# Patient Record
Sex: MALE | Race: WHITE
[De-identification: names, ages, dates, MRNs, and addresses within clinical notes are randomized per-mention and may not be internally consistent; named-entity substitution may affect disease eponyms.]

---

## 2019-01-08 NOTE — E[place] [address]
Phone:  [phone]                     ELECTROCARDIOGRAM REPORT      
_______________________________________________________________________________
 
Name:       RITA DEL RIO             Room:           [address]    ADM IN  
M.R.#:  [id number]      Account #:      [id number]  
Admission:  19     Attend Phys:    AYE Dang
Discharge:               Date of Birth:  [birth year]  
         Report #: [id number]
    [id number]
_______________________________________________________________________________
THIS REPORT FOR:  //name//                      
 
                         [facility] ED
                                       
Test Date:    2019               Test Time:    19:28:31
Pat Name:     RITA DEL RIO         Department:   
Patient ID:   [id number]            Room:         [facility]
Gender:                               Technician:   ISABEL MAYER
:          1950               Requested By: Jordan Ortiz
Order Number: [id number] MD:   Gray Hall
                                 Measurements
Intervals                              Axis          
Rate:         68                       P:            30
NY:           262                      QRS:          -30
QRSD:         99                       T:            134
QT:           417                                    
QTc:          444                                    
                           Interpretive Statements
Sinus rhythm
Prolonged NY interval
Inferior infarct, old
Abnrm T, consider ischemia, anterolateral lds
No previous ECG available for comparison
 
Electronically Signed On 2019 16:56:48 CST by Gray Hall
[email or web address]
 
 
 
 
 
 
 
 
 
 
 
 
 
 
 
 
 
  <ELECTRONICALLY SIGNED>
                                           By: Gray Hall MD, FACC   
  19     1656
D: 19   _____________________________________
T: 19   Gray Hall MD, [facility]     /EPI

## 2019-01-08 NOTE — NUR
PT TRANSFERRED TO [facility] FLOOR AT 1540 ON A WHEELCHAIR ACCOMPANIED BY TWO FAMILY
MEMBERS, AND NURSING STAFF. VSS. SINUS RYHTM ON THE CARDIAC MONITOR. NO TEMP.
98.8 ORALLY. NO COMPLAINT. DIET ORDERED, PT AWAITING FOR DINNER IN BED. ECHO
COMPLETED AT BEDSIDE. PT IN BED CALL LIGHT AT REACH. WILL CONTINUE TO MONITOR.

## 2019-01-08 NOTE — NUR
Pt received from Tele at 0415; transferred due to resp distress which occurred
after pt vomited and possibly aspirated.  Pt on BIPAP.  VSS except T 102.4
axillary.  Family members X3 arrived to room soon afterward, including pt's
dtr and wife (wife arrived after other 2 visitors).  Pt calm and cooperative,
reports tolerating BIPAP and mask well.  Wright placed, draining clr yellow
urine.  IV antibiotics administered.  Rash on right side of chest which
appears to be caused by skin-on-skin contact between R arm and chest.  R arm
appears contracted and immobile, and resting against chest.  Will continue to
monitor.

## 2019-01-08 NOTE — NUR
PT RECEIVED FROM ED. SAT MAINTAINED IN RA. CALL LIGHT WITHIN REACH AND BED IN
LOW POSITION. PT VOMITED APPROX AMOUNT 5ML. SAT DECREASED, NOT MAINTAINED IN
NC, CONNECTED TO NON-REBREATHER ON 15L, CALLED RRT. SAT 82% WITH NRB. PT KEPT
ON BIPAP 100% FIO2. SAT 98%, ABLE TO FOLLOW COMMANDS AND TALK. C-XRAY AND EKG
DONE AND CHARTED. DR. MILLER INFORMED. ORDERS FOLLOWED FOR CT. PT TRANSFERRED
DOWN TO ICU.

## 2019-01-08 NOTE — EKG
[place] [address]
Phone:  [phone]                     ELECTROCARDIOGRAM REPORT      
_______________________________________________________________________________
 
Name:       RITA DEL RIO             Room:           [address]    ADM IN  
M.R.#:  [id number]      Account #:      [id number]  
Admission:  19     Attend Phys:    AYE Dang
Discharge:               Date of Birth:  [birth year]  
         Report #: [id number]
    [id number]
_______________________________________________________________________________
THIS REPORT FOR:  //name//                      
 
                          [facility]
                                       
Test Date:    2019               Test Time:    02:51:50
Pat Name:     RITA DEL RIO         Department:   
Patient ID:   [id number]            Room:         [facility]
Gender:       M                        Technician:   
:          1950               Requested By: Daisha Cohen
Order Number: [id number]    Dot MD:   Gray Hall
                                 Measurements
Intervals                              Axis          
Rate:         114                      P:            
LA:                                    QRS:          -16
QRSD:         85                       T:            132
QT:           308                                    
QTc:          425                                    
                           Interpretive Statements
sinus tachycardia
Ventricular premature complex
Inferior infarct, old
Anteroseptal infarct, old
Repol abnrm suggests ischemia, diffuse leads
No previous ECG available for comparison
 
Electronically Signed On 2019 17:00:14 CST by Gray Hall
[email or web address]
 
 
 
 
 
 
 
 
 
 
 
 
 
 
 
 
  <ELECTRONICALLY SIGNED>
                                           By: Gray Hall MD, [facility]   
  19     1700
D: 19 0251   _____________________________________
T: 19 0251   Gray Hall MD, [facility]     /EPI

## 2019-01-08 NOTE — NUR
TROPONIN LEVEL 20.9. DR ALEXANDER PAGED . DR LINSTON CALLED BACK AND SAID HE
IS AWARE TROPONIN WAS GOING TO BE ELEVATED. THAT HE WILL FOLLOW UP WITH THAT.

## 2019-01-09 NOTE — NUR
MET WITH PT TO DISCUSS HOME SITUATION/DC PLANNING. PT LIVES WITH WIFE.  HE
STATES HE IS VERY DEPENDENT ON HER, HE HAS HAD 'STROKES'.  CALL TO WIFE/MILA.
 SHE IS DPOA AND WILL BRING IN COPY OF FORM.  SHE STATED THEY RECENTLY MOVED
FROM [place] TO A HANDICAP ACCESSIBLE APT NEAR THEIR DTR/[place]AN[place] WHO LIVE ACROSS
THE STREET.  PT HAS RIGHT SIDED WEAKNESS FROM STROKE  AND WIFE ASSISTS WITH
ADLS, AND DOES ALL IADLS.  THEY HAVE A STAND ASSIST, MOTORIZED BED, JOHAN CANE,
W/C AND SHOWER BENCH.  PT HAS HAD REHAB STAY IN 2017 AS WELL AS SNF IN PAST.
HE HAS DONE OUTPT THERAPY AT [facility] IN [place].  SHE IS INTERESTED IN HIM
GOING TO REHAB AGAIN IF QUALIFIES, IF NOT, WOULD PREFER TO GO HOME WITH HH AS
SHE'S WORRIED HE'D GET DEPRESSED IN SNF.  DISCUSSED HH OPTIONS WITH HER ALSO.
SHE HAS NO PREFERENCE.  DISCUSSED WITH DR POLANCO, REHAB CONSULT PENDING.
WILL FOLLOW

## 2019-01-09 NOTE — NUR
RECEIVED REPORT FROM MAGO AND ASSUMED CARE OF PT @ 0098.PT IS A/O
X4,VSS,TRACING SB WITH 1ST DEGREE ON THE MONITOR.PT REMAINS ON 2L O2
NC.ASSESSMENT AS CHARTED.IV LEFT FOREARM PATENT AND SALINE LOCKED. IV #2 LEFT
FOREARM PATENT AND SALINE LOCKED.IV ANTIBIOTICS GIVEN.GARCIA SECURE AND
PATENT.PT NPO STATUS PENDING CARDIOLOGY SEEING THEM TODAY.PT IS CALM AND
COOPERATIVE WITH NO C/O PAIN AT TIME OF ASSESSMENT.PT IS UP WITH ASSIST OF
ONE.PT LEFT RESTING IN BED WITH CALL LIGHT AND FALL PRECAUTIONS IN PLACE.WILL
COTNINUE TO MONITOR.

## 2019-01-09 NOTE — NUR
patient remains without chest pain this shift. patient denies discomfort. o2
sat maintained on 2l o2 nc although lung sounds remain coarse. patient
suspected to be at risk for aspiration due to difficulty swallowing thin
liquids and slight cough afterwards. patient remains npo for cardiology
consult and ST evaluation. patient verbalizes understanding. pt incontinent of
bowel. cesar care and barrier cream provided. call light within reach

## 2019-01-09 NOTE — NUR
VSS.CARDIAC MONITORING IN PLACE WITH NO CHANGES.PT REMAINS ON 2L O2 NC.NO C/O
PAIN.PT HAS BEEN REPOSITIONED Q2 HOUR.IV LEFT FOREARM PATENT AND SALINE
LOCKED.IV #2 PATENT AND SALINE LOCKED.PT TO BE NPO AT MIDNIGHT FOR CATH IN
AM.PT INFORMED OF PLAN OF CARE AND COMMUNICATES UNDERSTANDING.HOURLY ROUNDING
COMPELTED FOR PT SAFETY.CALL LIGHT AND FALL PRECAUTIONS IN PLACE.WILL CONTINUE
TO MONITOR FOR DURATION OF SHIFT.

## 2019-01-10 NOTE — NUR
vss, PT IS PROGRESSING TOWARD GOAL, PT HAS BEEN UP IN CHAIR, WORKED WITH
PT/OT/ST, HOURLY ROUNDS COMPLETED AND CHART CHECKED.

## 2019-01-10 NOTE — NUR
VSS, ASSUMED CARE THIS AM, ASSESSMENT PERFORMED AND CHARTED, FALL PRECAUTIONS
IN PLACE AND CALL LIGHT IN REACH, PT IS A&O4, ON 2L NC, UP QITH ONE TO
BEDSIDE, PT IS TRACIG SR ON THE MONITOR, HAS GARCIA IN PLACE AND IS DRAINIG, PT
GOAL IS TO SIT UP IN CHAIR, HE HAS RIGHT ARMWEAKNWSS FROM ILD STROKW, HW IS AT
RISK FOR ASPIRATION AND SPEECH IS WORKING WITH HIM, PT MIGHT HEART CATH TODAY
AND IS NPO. WILL FOLLOW WITH PLAN OF CARE.

## 2019-01-10 NOTE — NUR
RE: CHF medication education. Met with patient to discuss heart failure
medication. Pt's wife present during discussion. Review focused on carvedilol,
lisinopril & isosorbide. Discussed rationale for therapy and importance of
compliance with prescribed regimen. Reviewed possible side effects and
potential management strategies. Left medication information sheet with
patient. Provided pharmacy contact information for any further questions or
issues.  thank you.

## 2019-01-10 NOTE — CON
[address]  [address]                    CONSULTATION                  
_______________________________________________________________________________
 
Name:       RITA DEL RIO             Room:           [address] [place]  
.R.#:  [id number]      Account #:      [id number]  
Admission:  01/07/19     Attend Phys:    AYE Dang
Discharge:               Date of Birth:  [birth year]  
         Report #: [id number]
                                                                     [id number]  
_______________________________________________________________________________
THIS REPORT FOR:  //name//                      
 
CC:  _____ 
    Aubree Varela DO
    Daisha Cohen
 
DATE OF SERVICE:  01/08/2019
 
 
INDICATION:  Non-ST elevation myocardial infarction.
 
HISTORY OF PRESENT ILLNESS:  The patient is a very pleasant 68-year-old
gentleman who was admitted to the hospital with sore throat, cough and
right-sided pneumonia.  He denied any chest pain.  He did have some shortness of
breath occasionally.  He denies orthopnea.  In this setting, he had a troponin
of 8.  EKG did not show any evidence of ST elevation.  The patient has remained
pain free since admission to the hospital.  He does report having several stents
placed in 2015 at [facility].  He also reports having CVA on 2 separate
occasions.  He presently denies any orthopnea or paroxysmal nocturnal dyspnea. 
He is without other cardiac complaint at this time.
 
PAST MEDICAL HISTORY:
1.  Coronary artery disease with percutaneous coronary intervention remotely.
2.  CVA x 2.
3.  Hypertension.
4.  Dyslipidemia.
5.  Type 2 diabetes mellitus.
6.  Hypothyroidism.
7.  History of cholecystectomy.
 
ALLERGIES:  No known drug allergies.
 
HOME MEDICATIONS:  Amlodipine 5 mg p.o. q. day, aspirin 81 mg p.o. q. day,
atorvastatin 40 mg p.o. q. day, carvedilol 12.5 mg p.o. b.i.d., Plavix 75 mg
p.o. q. day, Imdur 30 mg p.o. q. day, levothyroxine 125 mcg p.o. q. day,
metformin 500 mg p.o. b.i.d., Accupril 20 mg p.o. b.i.d., vitamin D 1000 units
p.o. daily.
 
SOCIAL HISTORY:  The patient is .  He smoked cigars up until 2 years ago.
 He does not drink alcohol.
 
FAMILY HISTORY:  The patient's father had coronary disease.
 
REVIEW OF SYSTEMS:  A 14-point review of systems is positive for right-sided
weakness from his strokes.  He has had some weight loss after his last stroke. 
 
 
 
[place] [address]                    CONSULTATION                  
_______________________________________________________________________________
 
Name:       RITA DEL RIO             Room:           [address]#:  [id number]      Account #:      [id number]  
Admission:  01/07/19     Attend Phys:    AYE Dang
Discharge:               Date of Birth:  [birth year]  
         Report #: [id number]
                                                                     [id number]  
_______________________________________________________________________________
He reports a fever here in the hospital.  He has a cough productive of clear to
yellow sputum.  He reports diabetes and thyroid disease.  He reports seasonal
allergies as a child.  He denies any history of dysuria, but does have some
nocturia.  He wears glasses without acute visual change.
 
PHYSICAL EXAMINATION:
VITAL SIGNS:  Blood pressure 147/69, pulse 64 and regular.
GENERAL:  This is a pleasant elderly gentleman in no distress.  Mood and affect
appropriate.
HEENT:  Extraocular muscles intact.  Mucous membranes are moist.
NECK:  Examination of the neck shows no jugular venous distention.  I do not
appreciate any carotid bruit.
CHEST:  Reveals coarse breath sounds throughout.
CARDIOVASCULAR:  Reveals a regular rhythm with normal S1 and S2.  I do not
appreciate gallop or murmur.
ABDOMEN:  Reveals normal bowel sounds.  The abdomen is soft, nontender.
EXTREMITIES:  Shows no edema.  Peripheral pulses 2+ and palpable.  There is
right-sided weakness.
 
A 12-lead EKG shows sinus rhythm without acute ST or T-wave abnormality.
 
NT-proBNP is 7587.  Initial troponin is 8.79.
 
IMPRESSION AND RECOMMENDATIONS:
1.  Elevated troponin, likely due to non-ST elevation myocardial infarction
versus type 2 myocardial infarction with strain secondary to sepsis and heart
failure.  At this point in time, I have elected to obtain an echocardiogram. 
May need to proceed with catheterization.  Serial troponins have been ordered
and are pending.  Continue Plavix and aspirin at this time.
2.  Hypertension, adequately controlled on current cardiac regimen.
3.  Dyslipidemia.  Continue atorvastatin at current dose.
4.  Acute pneumonia, per primary physician.
5.  Acute on chronic heart failure.  Echocardiogram ordered.  The patient
appears relatively compensated at this point in time.
 
 
 
 
 
 
 
 
 
 
<ELECTRONICALLY SIGNED>
                                        By:  Gray Hall MD, FACC   
01/10/19     0818
D: 01/08/19 1538_______________________________________
T: 01/08/19 2344Gray Hall MD, FACC      /nt

## 2019-01-10 NOTE — NUR
RECEIVED INPATIENT REHAB CONSULT AND ACKNOWLEDGED BY DR. MARS AND REHAB
LIAISON.  AWAITING PT/TO EVALUATIONS FOR FURTHER INFORMATION ON FUNCTIONAL
PERFORMANCE. WILL CONTINUE TO FOLLOW THROUGH POC.  THANK YOU

## 2019-01-10 NOTE — NUR
RECEIVED REPORT AND ASSUMED CARE AT 1900. VSS. CARDIAC MONITORING IN PLACE. PT
DENIES ANY COMPLAINTS OF PAIN. ASSESSMENT COMPLETED AS CHARTED. DISCUSSED PLAN
OF CARE WITH PT, VERBALIZED UNDERSTANDING. PT TO HAVE CARDIAC CATH 1/10/19.
NPO AT MIDNIGHT. PT UP WITH ASSIST WITH WALKER. ON 2L NC. BED LOCKED IN LOWEST
POSITION, CALL LIGHT WITHIN REACH, BED ALARM ON. POSITION CHANGED EVERY TWO
HOURS, HEELS OFF LOADED. HOURLY ROUNDING COMPLETED AND ALL NEEDS MET. WILL
CONTINUE TO MONITOR

## 2019-01-11 NOTE — NUR
Pt has opted to dc home with HH. Spoke with Pt and wife, referral sent to
[facility] . Wife in room and will transport

## 2019-01-11 NOTE — NUR
SPOKE WITH PT AND WIFE IN ROOM. PT HAS DETERMINED HE IS WANTING TO GO HOME
WITH HH AT THIS TIME VS INPATIENT REHAB. THANK YOU FOR THE CONSULT

## 2019-01-11 NOTE — NUR
ASSUMED PT CARE AT 1930, NURSING ASSESSMENT COMPLETED AT START OF SHIFT. PT
VOICED NO CONCERNS, DENIES PAIN THIS SHIFT. CARDIAC MONITOR IN PLACE, TRACING
SINUS BRADYCARDIA/SINUS RHYTHM THIS SHIFT, HR IN MID 40S-MID 60'S. HOURLY
ROUNDING COMPLETED, Q2H REPOSITIONING COMPLETED. FALL PRECAUTIONS IN PLACE.
CALL LIGHT WITHIN REACH.

## 2019-01-11 NOTE — NUR
VSS, FILLED OUT D/C ORDERS, TOOK OUT IV, AND TELE MONITOR, PROVITED DR FOLLOW
UP APPOINTMENT, EXPLAINED D/C INSTRUCTIONS, HELPED GATHERED PT BELONGINGS,
TOOK OUT GARCIA CATH, PT WAS TAKEN OUT VIA WHEEL CHAIR TO CAR BY STAFF,

## 2019-01-11 NOTE — NUR
VSS, ASSUMED CARE OF PT THIS AM, ASSESSMENT PERFORMED AND CHARTED, FALL
PRECAUTIONS IN PLACE AND CALL LIGHT IN REACH, PT IS A&O4 UP WITH ONE, BELT,
WALKER, TO BS, HE IS TRACING SR ON THE MONITOR ON RA, DENIES ANY PAIN, HAS
RIGHT ARM WEAKNESS, PT GOAL IS TO D/C GARCIA, D/C TO HOME, AND SIT UP IN CHAIR,
WILL FOLLOW WITH PLAN OF CARE AND HOURLY ROUNDS.

## 2019-04-06 NOTE — NUR
RECEIVED REPORT FROM ED RN. PT TRANSFERRED TO  207. PT A&OX4. VSS. CARDIAC
MONITOR IN PLACE. ADMISSION HISTORY & PHYSICAL ASSESSMENT COMPLETED AND
CHARTED. FALL FORM SIGNED. PT ORIENTED TO ROOM AND CALL LIGHT. PT DENIES ANY
PAIN OR DISCOMFORT. PT RESTED WELL ON BED . HOURLY ROUNDING OBSERVED. CALL
LIGHT WITHIN REACH.

## 2019-04-06 NOTE — NUR
PATIENT RESTING IN BED. WIFE AT BEDSIDE. PATIENT DENIES ANY PAIN. PATIENT
DENIES ANY TROUBLE BREATHING. PATIENT HAS FAIR APPETITE. PATIENT DENIES ANY
NEEDS AT THIS TIME. CALL LIGHT WITHIN REACH. WILL CONTINUE TO MONITOR.

## 2019-04-06 NOTE — EKG
[place] [address]
Phone:  [phone]                     ELECTROCARDIOGRAM REPORT      
_______________________________________________________________________________
 
Name:       RITA DEL RIO             Room:           [address]    ADM IN  
M.R.#:  [id number]      Account #:      [id number]  
Admission:  19     Attend Phys:    Marylin Eller MD 
Discharge:               Date of Birth:  [birth year]  
         Report #: [id number]
    [id number]
_______________________________________________________________________________
THIS REPORT FOR:  //name//                      
 
                         [facility] ED
                                       
Test Date:    2019               Test Time:    16:44:06
Pat Name:     RITA DEL RIO         Department:   
Patient ID:   [id number]            Room:         [facility]
Gender:                               Technician:   SIABEL MAYER
:          1950               Requested By: Chantell Eaton
Order Number: [id number] MD:   Zach Haney
                                 Measurements
Intervals                              Axis          
Rate:         66                       P:            33
LA:           280                      QRS:          11
QRSD:         100                      T:            -75
QT:           427                                    
QTc:          448                                    
                           Interpretive Statements
Sinus rhythm
Ventricular premature complex
Prolonged LA interval
Borderline low voltage, extremity leads
Borderline repolarization abnormality
Baseline wander in lead(s) V2
Compared to ECG 2019 02:51:50
First degree AV block now present
Sinus tachycardia no longer present
Myocardial infarct finding no longer present
Possible ischemia no longer present
 
Electronically Signed On 2019 11:53:23 CDT by Zach Haney
[email or web address]
 
 
 
 
 
 
 
 
 
 
 
  <ELECTRONICALLY SIGNED>
                                           By: Zach Haney MD, [facility]      
  19     1153
D: 19 1644   _____________________________________
T: 19 1644   Zach Haney MD, [facility]        /EPI

## 2019-04-07 NOTE — NUR
ASSUMED CARE OF PT AFTER REPORT AT 1930. PT A&OX4. VSS. PHYSICAL ASSESSMENT
COMPLETED AND CHARTED. PT ON O2 AT 2L NC WITH 99% O2 SAT. PT TRACING SR 1ST
DEG ON TELE. PT DENIES ANY PAIN OR DISCOMFORT. PT TURNED TO SIDES. PT RESTED
WELL ONBED. CALL LIGHT WITHIN REACH.

## 2019-04-08 NOTE — NUR
VITALLY STABLE, NO CHEST PAIN AND WITH RIGHT SIDED WEAKNESS.STILL ON O2 NC AT
2LPM.NO EDEMA NOTED AT LOWER EXTRIMITIES.

## 2019-04-08 NOTE — NUR
Pt is A&O. Resides at home with his wife. Current with [facility]. Pt
discharging to home today, faxed resumption orders to [facility].

## 2019-04-08 NOTE — NUR
ORDER RECEIVED FOR "OT EVALUATION AND TREATMENT". SPOKE WITH RN, WHO STATES
THAT PATIENT IS GOING HOME. WILL D/C FROM OT [facility].

## 2019-04-09 NOTE — CON
[address]  [address]                    CONSULTATION                  
_______________________________________________________________________________
 
Name:       DEL RIORITA             Room:           [address] [place]  
M.R.#:  [id number]      Account #:      [id number]  
Admission:  19     Attend Phys:    Marylin Eller MD 
Discharge:  19     Date of Birth:  [birth year]  
         Report #: [id number]
                                                                     [id number]  
_______________________________________________________________________________
THIS REPORT FOR:  //name//                      
 
CC: Marylin Varela DO
 
DATE OF SERVICE:  2019
 
 
CARDIOLOGY CONSULTATION
 
HISTORY OF PRESENT ILLNESS:  The patient is a 68-year-old  white male who
I was asked to see in the hospital today after he complained of being short of
breath.  The patient has an extensive past medical history.  Unfortunately, not
a lot of his old records available.  He has a long history of hypertension,
diabetes.  Apparently about 4 years ago, he was seen by Dr. Dalal and underwent
coronary artery stenting at [facility].  Unfortunately, following the
procedure, he had a stroke and was left with dense right-sided hemiplegia.  He
is now basically bedridden.  He denies any recent chest pain, palpitations or
syncope.  He ambulates only with assistance.  In the last few days, he has had
increasing shortness of breath and edema.  He went to see his primary care
physician who sent him to the Emergency Room.  He was admitted for further
evaluation and treatment.  He denies recent fever or cough.
 
PAST MEDICAL HISTORY:  He has had cholecystectomy, kidney stone removal,
hypertension, diabetes.
 
MEDICATIONS ON ADMISSION:  Consists of amlodipine, aspirin, Lipitor, carvedilol,
Plavix, Synthroid, metformin, quinapril, Imdur.
 
ALLERGIES:  He has no known drug allergies.
 
FAMILY HISTORY:  His father  in his 40s.
 
SOCIAL HISTORY:  He is .  He and his wife live in [place].  No smoking.
 Rarely drinks alcohol.
 
REVIEW OF SYSTEMS:  He has had apparently more than one stroke in the past.  He
had asthma as a child.  No history of liver disease, cancer, psychiatric illness
or chronic skin condition.
 
PHYSICAL EXAMINATION:
GENERAL:  Revealed an elderly male, lying in bed.  He appeared in no acute
distress.
VITAL SIGNS:  He had a blood pressure of 140/80, pulse 60.  He is afebrile.
HEENT:  He is anicteric.  Conjunctivae pink.  Mucous membranes appear moist.
 
 
 
[place] [address]                    CONSULTATION                  
_______________________________________________________________________________
 
Name:       RITA DEL RIO             Room:           [address]#:  [id number]      Account #:      [id number]  
Admission:  19     Attend Phys:    Marylin Eller MD 
Discharge:  19     Date of Birth:  [birth year]  
         Report #: [id number]
                                                                     [id number]  
_______________________________________________________________________________
NECK:  Neck veins do not appear distended.
CHEST:  Clear to auscultation.
CARDIOVASCULAR:  Regular rate and rhythm.
ABDOMEN:  Soft.
EXTREMITIES:  No edema.
SKIN:  Cool and dry.
NEUROLOGIC:  He has dense right hemiplegia.
 
LABORATORY DATA:  His workup, he actually had an echocardiogram in January here
at [facility] that showed ejection fraction of only 40% with biatrial
enlargement.  His workup in the Emergency Room yesterday, he had a portable
chest x-ray that showed cardiomegaly, some atelectasis.  No pulmonary edema.  CT
scan of the chest was done yesterday using a PE protocol that showed no
pulmonary embolus, small nodule, some atelectasis.  Venous duplex scan of his
legs was done yesterday that showed no evidence of DVT.  He had lab work, sodium
138, creatinine 1.3, glucose 140, albumin 3.5.  His troponin is 4.13, although
of note, in January it was 9.43.  BNP 28,504.  White blood cell count 8.5,
hemoglobin 10.9, it was 10.7 in January.
 
IMPRESSION AND RECOMMENDATIONS:
1.  Non-ST segment elevation myocardial infarction, chronic elevation of
troponin.  No history of angina.  ECG on admission showed no significant ST or
T-wave changes.  I would continue aspirin a day.
2.  Acute on chronic systolic heart failure.  The patient has been on beta
blocker, ACE inhibitor.  I would recommend adding Aldactone.
3.  Previous coronary artery stenting.  No recent angina.  I would continue
aspirin 81 mg a day.
4.  Previous stroke with right hemiplegia.  The patient is basically bedridden.
5.  History of kidney stone.
6.  Diabetes.
 
 
 
 
 
 
 
 
 
 
 
 
 
 
<ELECTRONICALLY SIGNED>
                                        By:  Zach Haney MD, [facility]      
19     1724
D: 19 0958_______________________________________
T: 19 2143Dhemanth Haney MD, FACC         /nt

## 2019-08-18 ENCOUNTER — HOSPITAL ENCOUNTER (INPATIENT)
Dept: HOSPITAL 96 - M.ERS | Age: 69
LOS: 3 days | Discharge: HOME HEALTH | DRG: 871 | End: 2019-08-21
Attending: INTERNAL MEDICINE | Admitting: INTERNAL MEDICINE
Payer: MEDICARE

## 2019-08-18 VITALS — DIASTOLIC BLOOD PRESSURE: 82 MMHG | SYSTOLIC BLOOD PRESSURE: 115 MMHG

## 2019-08-18 VITALS — DIASTOLIC BLOOD PRESSURE: 64 MMHG | SYSTOLIC BLOOD PRESSURE: 108 MMHG

## 2019-08-18 VITALS — SYSTOLIC BLOOD PRESSURE: 108 MMHG | DIASTOLIC BLOOD PRESSURE: 81 MMHG

## 2019-08-18 VITALS — WEIGHT: 164 LBS | BODY MASS INDEX: 32.2 KG/M2 | HEIGHT: 60 IN

## 2019-08-18 VITALS — SYSTOLIC BLOOD PRESSURE: 129 MMHG | DIASTOLIC BLOOD PRESSURE: 85 MMHG

## 2019-08-18 DIAGNOSIS — I21.4: ICD-10-CM

## 2019-08-18 DIAGNOSIS — D64.9: ICD-10-CM

## 2019-08-18 DIAGNOSIS — Z87.442: ICD-10-CM

## 2019-08-18 DIAGNOSIS — I42.9: ICD-10-CM

## 2019-08-18 DIAGNOSIS — Z79.82: ICD-10-CM

## 2019-08-18 DIAGNOSIS — I12.9: ICD-10-CM

## 2019-08-18 DIAGNOSIS — N18.9: ICD-10-CM

## 2019-08-18 DIAGNOSIS — E78.5: ICD-10-CM

## 2019-08-18 DIAGNOSIS — N17.0: ICD-10-CM

## 2019-08-18 DIAGNOSIS — Z66: ICD-10-CM

## 2019-08-18 DIAGNOSIS — E11.22: ICD-10-CM

## 2019-08-18 DIAGNOSIS — I69.351: ICD-10-CM

## 2019-08-18 DIAGNOSIS — E03.9: ICD-10-CM

## 2019-08-18 DIAGNOSIS — K59.00: ICD-10-CM

## 2019-08-18 DIAGNOSIS — Z82.49: ICD-10-CM

## 2019-08-18 DIAGNOSIS — Z79.84: ICD-10-CM

## 2019-08-18 DIAGNOSIS — Z90.49: ICD-10-CM

## 2019-08-18 DIAGNOSIS — I25.2: ICD-10-CM

## 2019-08-18 DIAGNOSIS — Z98.49: ICD-10-CM

## 2019-08-18 DIAGNOSIS — A41.9: Primary | ICD-10-CM

## 2019-08-18 DIAGNOSIS — N39.0: ICD-10-CM

## 2019-08-18 DIAGNOSIS — Z95.5: ICD-10-CM

## 2019-08-18 LAB
ABSOLUTE BASOPHILS: 0.1 THOU/UL (ref 0–0.2)
ABSOLUTE EOSINOPHILS: 0.1 THOU/UL (ref 0–0.7)
ABSOLUTE MONOCYTES: 1.7 THOU/UL (ref 0–1.2)
ALBUMIN SERPL-MCNC: 3.5 G/DL (ref 3.4–5)
ALP SERPL-CCNC: 69 U/L (ref 46–116)
ALT SERPL-CCNC: 20 U/L (ref 30–65)
ANION GAP SERPL CALC-SCNC: 14 MMOL/L (ref 7–16)
AST SERPL-CCNC: 55 U/L (ref 15–37)
BASOPHILS NFR BLD AUTO: 0.9 %
BILIRUB SERPL-MCNC: 0.8 MG/DL
BILIRUB UR-MCNC: NEGATIVE MG/DL
BUN SERPL-MCNC: 34 MG/DL (ref 7–18)
CALCIUM SERPL-MCNC: 8.8 MG/DL (ref 8.5–10.1)
CHLORIDE SERPL-SCNC: 102 MMOL/L (ref 98–107)
CO2 SERPL-SCNC: 22 MMOL/L (ref 21–32)
COLOR UR: YELLOW
CREAT SERPL-MCNC: 1.9 MG/DL (ref 0.6–1.3)
EOSINOPHIL NFR BLD: 0.5 %
GLUCOSE SERPL-MCNC: 160 MG/DL (ref 70–99)
GRANULOCYTES NFR BLD MANUAL: 78.3 %
HCT VFR BLD CALC: 32.8 % (ref 42–52)
HCT VFR BLD CALC: 32.8 % (ref 42–52)
HGB BLD-MCNC: 10.8 GM/DL (ref 14–18)
HGB BLD-MCNC: 10.8 GM/DL (ref 14–18)
HYALINE CASTS #/AREA URNS LPF: (no result) /LPF
INR PPP: 1.1
KETONES UR STRIP-MCNC: NEGATIVE MG/DL
LIPASE: 70 U/L (ref 73–393)
LYMPHOCYTES # BLD: 1.2 THOU/UL (ref 0.8–5.3)
LYMPHOCYTES NFR BLD AUTO: 8.6 %
MCH RBC QN AUTO: 30.5 PG (ref 26–34)
MCH RBC QN AUTO: 30.7 PG (ref 26–34)
MCHC RBC AUTO-ENTMCNC: 33 G/DL (ref 28–37)
MCHC RBC AUTO-ENTMCNC: 33.1 G/DL (ref 28–37)
MCV RBC: 92.3 FL (ref 80–100)
MCV RBC: 92.8 FL (ref 80–100)
MONOCYTES NFR BLD: 11.7 %
MPV: 8.2 FL. (ref 7.2–11.1)
MPV: 8.2 FL. (ref 7.2–11.1)
NEUTROPHILS # BLD: 11.2 THOU/UL (ref 1.6–8.1)
NT-PRO BRAIN NAT PEPTIDE: (no result) PG/ML (ref ?–300)
NUCLEATED RBCS: 0 /100WBC
PLATELET COUNT*: 216 THOU/UL (ref 150–400)
PLATELET COUNT*: 240 THOU/UL (ref 150–400)
POTASSIUM SERPL-SCNC: 4.4 MMOL/L (ref 3.5–5.1)
PROT SERPL-MCNC: 7.4 G/DL (ref 6.4–8.2)
PROT UR QL STRIP: (no result)
PROTHROMBIN TIME: 11.5 SECONDS (ref 9.2–11.5)
RBC # BLD AUTO: 3.53 MIL/UL (ref 4.5–6)
RBC # BLD AUTO: 3.56 MIL/UL (ref 4.5–6)
RBC # UR STRIP: NEGATIVE /UL
RDW-CV: 17.4 % (ref 10.5–14.5)
RDW-CV: 18 % (ref 10.5–14.5)
SODIUM SERPL-SCNC: 138 MMOL/L (ref 136–145)
SP GR UR STRIP: 1.02 (ref 1–1.03)
SQUAMOUS: (no result) /LPF (ref 0–3)
TROPONIN-I LEVEL: 9.01 NG/ML (ref ?–0.06)
URINE CLARITY: CLEAR
URINE GLUCOSE-RANDOM: NEGATIVE
URINE LEUKOCYTES-REFLEX: (no result)
URINE NITRITE-REFLEX: NEGATIVE
URINE WBC-REFLEX: (no result) /HPF (ref 0–5)
UROBILINOGEN UR STRIP-ACNC: 0.2 E.U./DL (ref 0.2–1)
WBC # BLD AUTO: 14.2 THOU/UL (ref 4–11)
WBC # BLD AUTO: 14.8 THOU/UL (ref 4–11)

## 2019-08-19 VITALS — SYSTOLIC BLOOD PRESSURE: 110 MMHG | DIASTOLIC BLOOD PRESSURE: 84 MMHG

## 2019-08-19 VITALS — DIASTOLIC BLOOD PRESSURE: 74 MMHG | SYSTOLIC BLOOD PRESSURE: 109 MMHG

## 2019-08-19 VITALS — DIASTOLIC BLOOD PRESSURE: 81 MMHG | SYSTOLIC BLOOD PRESSURE: 111 MMHG

## 2019-08-19 VITALS — SYSTOLIC BLOOD PRESSURE: 95 MMHG | DIASTOLIC BLOOD PRESSURE: 71 MMHG

## 2019-08-19 VITALS — DIASTOLIC BLOOD PRESSURE: 64 MMHG | SYSTOLIC BLOOD PRESSURE: 100 MMHG

## 2019-08-19 VITALS — DIASTOLIC BLOOD PRESSURE: 49 MMHG | SYSTOLIC BLOOD PRESSURE: 91 MMHG

## 2019-08-19 VITALS — DIASTOLIC BLOOD PRESSURE: 45 MMHG | SYSTOLIC BLOOD PRESSURE: 96 MMHG

## 2019-08-19 VITALS — SYSTOLIC BLOOD PRESSURE: 100 MMHG | DIASTOLIC BLOOD PRESSURE: 71 MMHG

## 2019-08-19 VITALS — SYSTOLIC BLOOD PRESSURE: 120 MMHG | DIASTOLIC BLOOD PRESSURE: 89 MMHG

## 2019-08-19 VITALS — DIASTOLIC BLOOD PRESSURE: 68 MMHG | SYSTOLIC BLOOD PRESSURE: 122 MMHG

## 2019-08-19 VITALS — SYSTOLIC BLOOD PRESSURE: 97 MMHG | DIASTOLIC BLOOD PRESSURE: 66 MMHG

## 2019-08-19 VITALS — DIASTOLIC BLOOD PRESSURE: 81 MMHG | SYSTOLIC BLOOD PRESSURE: 117 MMHG

## 2019-08-19 VITALS — SYSTOLIC BLOOD PRESSURE: 94 MMHG | DIASTOLIC BLOOD PRESSURE: 61 MMHG

## 2019-08-19 VITALS — DIASTOLIC BLOOD PRESSURE: 68 MMHG | SYSTOLIC BLOOD PRESSURE: 101 MMHG

## 2019-08-19 VITALS — DIASTOLIC BLOOD PRESSURE: 75 MMHG | SYSTOLIC BLOOD PRESSURE: 105 MMHG

## 2019-08-19 VITALS — DIASTOLIC BLOOD PRESSURE: 65 MMHG | SYSTOLIC BLOOD PRESSURE: 90 MMHG

## 2019-08-19 VITALS — DIASTOLIC BLOOD PRESSURE: 70 MMHG | SYSTOLIC BLOOD PRESSURE: 104 MMHG

## 2019-08-19 VITALS — SYSTOLIC BLOOD PRESSURE: 116 MMHG | DIASTOLIC BLOOD PRESSURE: 65 MMHG

## 2019-08-19 VITALS — SYSTOLIC BLOOD PRESSURE: 116 MMHG | DIASTOLIC BLOOD PRESSURE: 64 MMHG

## 2019-08-19 NOTE — NUR
PATIENT PROGRESSING TOWARDS GOALS. APTT NOW THERAPUETIC. NO CHANGES MADE AND
REDRAW ENTERED FROM 1900 TONIGHT. PATIENT REPORTS MINIMAL CHEST PRESSURE. HES
STATED "IT IS ALMOST COMPLETELY GONE". HAS BEEN RESTING ON AND OFF. POOR
APPETITE NOTED. WIFE AND FAMILY PRESENT AT BEDSIDE. ALL QUESTIONS ANSWERED.

## 2019-08-19 NOTE — NUR
Pt is A&O. Resides at home with his wife. Wife completes all IADLS and assist
Pt with ADLs. Per Pt, he is wc bound, uses a stand lift for transfers. Pt also
has a cane and walker at home. Hx of [facility]. No hx of SNF. Hx of rehab at
[facility]. Goal is home with HH at [place].
 
ICU rounds: Pt has severe CAD, Pt on Hep gtt, Pt declined cath d/t his
cardiologist Dr Hdez informing him in the past that there is nothing else
that can be done to correct. Continue to treat medically.

## 2019-08-19 NOTE — EKG
[place] [address]
Phone:  [phone]                     ELECTROCARDIOGRAM REPORT      
_______________________________________________________________________________
 
Name:       RITA DEL RIO        Room:           [address]    ADM [place]  
M.R.#:  [id number]      Account #:      [id number]  
Admission:  19     Attend Phys:    Gabino Worthington MD 
Discharge:               Date of Birth:  [birth year]  
         Report #: [id number]
    [id number]
_______________________________________________________________________________
THIS REPORT FOR:  //name//                      
 
                          [facility]
                                       
Test Date:    2019               Test Time:    09:05:14
Pat Name:     RITA DEL RIO         Department:   
Patient ID:   [id number]            Room:         [address]
Gender:       M                        Technician:   
:          1950               Requested By: Gabino Worthington
Order Number: [id number]    Reading MD:   Zach Haney
                                 Measurements
Intervals                              Axis          
Rate:         63                       P:            0
TX:           273                      QRS:          22
QRSD:         100                      T:            149
QT:           419                                    
QTc:          429                                    
                           Interpretive Statements
Sinus rhythm
Atrial premature complex
Prolonged TX interval
Inferior infarct, old
Abnrm T, consider ischemia, anterolateral lds
 
 
Electronically Signed On 2019 17:15:36 CDT by Zach Hanye
[email or web address]
 
 
 
 
 
 
 
 
 
 
 
 
 
 
 
 
  <ELECTRONICALLY SIGNED>
                                           By: Zach Haney MD, [facility]      
  19     1715
D: 19   _____________________________________
T: 19   Zach Haney MD, FAC        /EPI

## 2019-08-19 NOTE — NUR
PT RECIEVED FROM ER AT 2153H, PUT IN BED 2 AND CONNECTED TO CARDIAC MONITOR,
NOTED WITH JUCTIONAL TACHYCARDIA AT 110BPM.ON NC AT 2LPM AND NO CHEST
PAIN.HEPARIN INFUSION HOLD AND TO RESTART AT 0715H.NO RESPIRATORY DISTRESS
NOTE AND NO BLEEDING.CONTINUE MONITORING AND TOWARDS GOALS.

## 2019-08-19 NOTE — NUR
PATIENT HAS MADE DECISION TO REFUSE CARDIAC CATH AFTER SPEAKING WITH
CARDIOLOGY. DR THOMPSON NOTIFIED. CATH LAB NOTIFIED. DR MARTINEZ NOTIFIED. DIET
ORDER RECIEVED BY HOSPITALIST.

## 2019-08-19 NOTE — EKG
[place] [address]
Phone:  [phone]                     ELECTROCARDIOGRAM REPORT      
_______________________________________________________________________________
 
Name:       DEL RIO,RITA DAVE        Room:           [address]    ADM [place]  
M.R.#:  [id number]      Account #:      [id number]  
Admission:  19     Attend Phys:    Gabino Worthington MD 
Discharge:               Date of Birth:  [birth year]  
         Report #: [id number]
    [id number]
_______________________________________________________________________________
THIS REPORT FOR:  //name//                      
 
                         [facility] ED
                                       
Test Date:    2019               Test Time:    19:09:04
Pat Name:     RITA DEL RIO         Department:   
Patient ID:   [id number]            Room:         [facility]
Gender:       M                        Technician:   FL
:          1950               Requested By: Jordan Ortiz
Order Number: [id number] MD:   Zach Haney
                                 Measurements
Intervals                              Axis          
Rate:         116                      P:            
NH:                                    QRS:          26
QRSD:         102                      T:            173
QT:           349                                    
QTc:          485                                    
                           Interpretive Statements
Junctional tachycardia
Inferior infarct, old
Lateral leads are also involved
Compared to ECG 2019 16:44:06
Junctional tachycardia now present
 
Sinus rhythm no longer present
Ventricular premature complex(es) no longer present
 
 
Electronically Signed On 2019 17:09:53 CDT by Zach Haney
[email or web address]
 
 
 
 
 
 
 
 
 
 
 
 
 
  <ELECTRONICALLY SIGNED>
                                           By: Zach Haney MD, [facility]      
  19     1709
D: 19   _____________________________________
T: 19   Zach Haney MD, [facility]        /EPI

## 2019-08-20 VITALS — DIASTOLIC BLOOD PRESSURE: 52 MMHG | SYSTOLIC BLOOD PRESSURE: 101 MMHG

## 2019-08-20 VITALS — SYSTOLIC BLOOD PRESSURE: 107 MMHG | DIASTOLIC BLOOD PRESSURE: 56 MMHG

## 2019-08-20 VITALS — SYSTOLIC BLOOD PRESSURE: 94 MMHG | DIASTOLIC BLOOD PRESSURE: 49 MMHG

## 2019-08-20 VITALS — SYSTOLIC BLOOD PRESSURE: 104 MMHG | DIASTOLIC BLOOD PRESSURE: 56 MMHG

## 2019-08-20 VITALS — DIASTOLIC BLOOD PRESSURE: 59 MMHG | SYSTOLIC BLOOD PRESSURE: 118 MMHG

## 2019-08-20 VITALS — DIASTOLIC BLOOD PRESSURE: 69 MMHG | SYSTOLIC BLOOD PRESSURE: 130 MMHG

## 2019-08-20 VITALS — SYSTOLIC BLOOD PRESSURE: 98 MMHG | DIASTOLIC BLOOD PRESSURE: 42 MMHG

## 2019-08-20 VITALS — SYSTOLIC BLOOD PRESSURE: 103 MMHG | DIASTOLIC BLOOD PRESSURE: 58 MMHG

## 2019-08-20 VITALS — DIASTOLIC BLOOD PRESSURE: 63 MMHG | SYSTOLIC BLOOD PRESSURE: 119 MMHG

## 2019-08-20 VITALS — DIASTOLIC BLOOD PRESSURE: 55 MMHG | SYSTOLIC BLOOD PRESSURE: 121 MMHG

## 2019-08-20 LAB
ABSOLUTE BASOPHILS: 0.1 THOU/UL (ref 0–0.2)
ABSOLUTE EOSINOPHILS: 0.2 THOU/UL (ref 0–0.7)
ABSOLUTE MONOCYTES: 1.1 THOU/UL (ref 0–1.2)
ANION GAP SERPL CALC-SCNC: 7 MMOL/L (ref 7–16)
BASOPHILS NFR BLD AUTO: 1.1 %
BUN SERPL-MCNC: 40 MG/DL (ref 7–18)
CALCIUM SERPL-MCNC: 8.1 MG/DL (ref 8.5–10.1)
CHLORIDE SERPL-SCNC: 104 MMOL/L (ref 98–107)
CO2 SERPL-SCNC: 26 MMOL/L (ref 21–32)
CREAT SERPL-MCNC: 1.8 MG/DL (ref 0.6–1.3)
EOSINOPHIL NFR BLD: 3.1 %
GLUCOSE SERPL-MCNC: 127 MG/DL (ref 70–99)
GRANULOCYTES NFR BLD MANUAL: 63.2 %
HCT VFR BLD CALC: 26.9 % (ref 42–52)
HGB BLD-MCNC: 9 GM/DL (ref 14–18)
LYMPHOCYTES # BLD: 1.3 THOU/UL (ref 0.8–5.3)
LYMPHOCYTES NFR BLD AUTO: 17.4 %
MCH RBC QN AUTO: 31.4 PG (ref 26–34)
MCHC RBC AUTO-ENTMCNC: 33.6 G/DL (ref 28–37)
MCV RBC: 93.6 FL (ref 80–100)
MONOCYTES NFR BLD: 15.2 %
MPV: 8.4 FL. (ref 7.2–11.1)
NEUTROPHILS # BLD: 4.8 THOU/UL (ref 1.6–8.1)
NUCLEATED RBCS: 0 /100WBC
PLATELET COUNT*: 169 THOU/UL (ref 150–400)
POTASSIUM SERPL-SCNC: 4.1 MMOL/L (ref 3.5–5.1)
RBC # BLD AUTO: 2.88 MIL/UL (ref 4.5–6)
RDW-CV: 18 % (ref 10.5–14.5)
SODIUM SERPL-SCNC: 137 MMOL/L (ref 136–145)
WBC # BLD AUTO: 7.5 THOU/UL (ref 4–11)

## 2019-08-20 NOTE — NUR
ASSUMED CARE AT 1910H, ON NC AT 2LPM, WELL TOLERATED AND ON HEPARIN DRIP.NO
BLEEDING AND RESPIRATORY DISTRESS NOTED. NO CHEST PAIN.CONTINUE MONITORING AND
TOWARDS GOALS.

## 2019-08-21 VITALS — SYSTOLIC BLOOD PRESSURE: 98 MMHG | DIASTOLIC BLOOD PRESSURE: 48 MMHG

## 2019-08-21 VITALS — SYSTOLIC BLOOD PRESSURE: 116 MMHG | DIASTOLIC BLOOD PRESSURE: 54 MMHG

## 2019-08-21 VITALS — SYSTOLIC BLOOD PRESSURE: 88 MMHG | DIASTOLIC BLOOD PRESSURE: 43 MMHG

## 2019-08-21 VITALS — SYSTOLIC BLOOD PRESSURE: 95 MMHG | DIASTOLIC BLOOD PRESSURE: 45 MMHG

## 2019-08-21 NOTE — CON
[address]  [address]                    CONSULTATION                  
_______________________________________________________________________________
 
Name:       RITA DEL RIO        Room:           [address] [place]  
M.R.#:  [id number]      Account #:      [id number]  
Admission:  19     Attend Phys:    Gabino Worthington MD 
Discharge:  19     Date of Birth:  [birth year]  
         Report #: [id number]
                                                                     [id number]  
_______________________________________________________________________________
THIS REPORT FOR:  //name//                      
 
CC: Gabino Worthington
    [facility]
 
DATE OF SERVICE:  2019
 
 
CARDIOLOGY CONSULTATION
 
HISTORY OF THE PRESENT ILLNESS:  The patient is a 69-year-old  white male
who I was asked to see in the hospital today after he developed evidence of a
non-STEMI.  The patient has an extensive past medical history.  He initially
presented in  with shortness of breath and chest pain.  He apparently had a
coronary artery stent placed by Dr. Dalal at [facility].  Apparently 6
months later, he had recurrent chest pain and shortness of breath.  Dr. Dalal
placed another coronary stent from the radial artery.  According to the wife at
the time of coronary stenting, the patient had a stroke with right-sided
weakness.  He had another stroke a year later, also affecting his right side. 
He has basically been bedridden since that time and uses a wheelchair.  The
patient was doing well until about 3 days ago began to vomit.  He had no
appetite, noticed some chest discomfort and shortness of breath.  Last night,
the vomiting became worse.  An ambulance was called.  He was brought here to [facility].  He ruled in for non-STEMI.  I was asked to see him for further
evaluation and treatment.  He denied diaphoresis or fever.  He has had a cough. 
Denied any palpitations.
 
PAST MEDICAL HISTORY:  He has had cataract extraction, cholecystectomy, kidney
stone removal, diabetes, hypertension and hyperlipidemia.
 
MEDICATIONS:  Consists of the following list.  He is on aspirin, Plavix,
Norvasc, Lipitor, carvedilol, Isordil, Synthroid, metformin, quinapril,
spironolactone and tramadol.
 
ALLERGIES:  He has no known drug allergies.
 
FAMILY HISTORY:  His father  of heart attack.
 
SOCIAL HISTORY:  He is .  He and his wife, who used to live near [place],
now live here close to [place] in [place].  He is a retired teacher.  No
smoking or alcohol abuse.
 
REVIEW OF SYSTEMS:  He has had a previous history of asthma.  No history of GI
bleeding.  He has had a kidney stone.  No cancer.  No chronic skin condition. 
No psychiatric illness.
 
 
 
[place] [address]                    CONSULTATION                  
_______________________________________________________________________________
 
Name:       RITA DEL RIO        Room:           [address]#:  [id number]      Account #:      [id number]  
Admission:  19     Attend Phys:    Gabino Worthington MD 
Discharge:  19     Date of Birth:  [birth year]  
         Report #: [id number]
                                                                     [id number]  
_______________________________________________________________________________
 
PHYSICAL EXAMINATION:
GENERAL:  Revealed elderly male, lying in bed, appeared in no acute distress.
VITAL SIGNS:  He had a blood pressure of 100/60, pulse is 90, and he is
afebrile.
HEENT:  He was anicteric.  Conjunctivae pink.  Mucous membranes appear moist.
NECK:  Veins do not appear distended.  No carotid bruits.  Neck is supple.
CHEST:  Clear to auscultation.
CARDIOVASCULAR:  Regular rate and rhythm.
ABDOMEN:  Soft.
EXTREMITIES:  Had no edema.  Dorsalis pedis pulse 2+ bilaterally.
SKIN:  Cool and dry.
NEUROLOGIC:  The right side is very slow moving and weak.
 
RADIOLOGICAL DATA:  His ECG on admission last night showed a sinus rhythm,
evidence of previous anterior infarction with T-wave inversion in the lateral
leads.  His workup, he had an echocardiogram in 2019 here at [facility] that
showed an ejection fraction of 40% with biatrial enlargement.  He had a portable
chest x-ray last night that showed no acute abnormality.  CT scan of the chest
was performed without contrast that showed extensive coronary artery
calcification.  He had a CT scan of the abdomen and pelvis that showed retained
stool, peripheral vascular disease.  CT scan of the head was performed without
contrast that showed atrophy, white matter changes.
 
LABORATORY WORK:  Sodium 138, BUN 34, creatinine 1.9, it was 1.5 in April.  His
troponin is 11.  BNP 35,000.  White blood cell count 14.8, hemoglobin 10.8, it
was 10.7 in January.
 
IMPRESSION AND RECOMMENDATIONS:
1.  Non-ST elevation myocardial infarction.  Recommend repeat cardiac
catheterization.
2.  Previous stroke.
3.  Cardiomyopathy.
4.  Chronic kidney disease.
5.  Anemia.  No history of bleeding.
6.  Nausea and vomiting.  Suspect secondary to non-ST elevation myocardial
infarction.
7.  History of kidney stones.
8.  Diabetes.
9.  Hypertension.
10.  Hyperlipidemia.  The patient is on a statin drug.
 
 
 
<ELECTRONICALLY SIGNED>
                                        By:  Zach Haney MD, FACC      
19     1636
D: 19 0837_______________________________________
T: 19 1112Dhemanth Haney MD, FACC         /nt

## 2019-08-21 NOTE — NUR
PT A/O. TELE TRACKING SR 1AVB DEGREE AVB AND ALL VSS ON ROOM AIR. DENIES CP,
SOA. EDUCATED ON SAFETY AND PLAN OF CARE. PLEASE SEE ASSESSMENT FOR ADDITIONAL
INFORMATION. FAMILY AT BEDSIDE. DC WITH ALL BELONGINGS AND VERBALIZE
UNDERSTANDING OF ALL DC INSTRUCTIONS AT 1500.

## 2019-08-21 NOTE — NUR
RECEIVED REPORT FROM ICU RN AND ASSUMED CARE OF PATIENT. AGREE WITH ICU RN
ASSESSMENT.  PATIENT DENIES PAIN AND DISCOMFORT. VSS ON ROOM AIR. PATIENT
INCONTINENT OF URINE AT TIMES. FOSTER CARE PROVIDED WITH INCONTINENCE CHECKS AND
REPOSITIONED Q2H. PATIENT POSSIBLY TO DISCHARGE HOME TODAY. CALL LIGHT WITHIN
REACH

## 2019-08-21 NOTE — NUR
JORGE NOTED FOR DC HOME WITH HH.  MET WITH PT AND WIFE, HE HAS BEEN ON
SERVICE WITH [email or web address] AND THEY WANT TO CONTINUE WITH THEM. CALLED AND FAXED
ORDERS TO [facility].

## 2019-08-21 NOTE — NUR
ASSUMED CARE OF PATIENT AT 1900. PATIENT TELE STATUS. PATIENT TRANSFERRED TO
TELE FLOOR, ROOM 223 AT 2100. BEDSIDE REPORT GIVEN TO TONY DANIEL.
